# Patient Record
Sex: MALE | Race: WHITE
[De-identification: names, ages, dates, MRNs, and addresses within clinical notes are randomized per-mention and may not be internally consistent; named-entity substitution may affect disease eponyms.]

---

## 2021-12-28 ENCOUNTER — HOSPITAL ENCOUNTER (OUTPATIENT)
Dept: HOSPITAL 35 - LAB | Age: 86
End: 2021-12-28
Payer: COMMERCIAL

## 2021-12-28 DIAGNOSIS — Z01.812: Primary | ICD-10-CM

## 2021-12-28 DIAGNOSIS — Z20.822: ICD-10-CM

## 2021-12-30 ENCOUNTER — HOSPITAL ENCOUNTER (OUTPATIENT)
Dept: HOSPITAL 35 - OR | Age: 86
Discharge: HOME | End: 2021-12-30
Attending: OPHTHALMOLOGY
Payer: COMMERCIAL

## 2021-12-30 VITALS — WEIGHT: 148 LBS | HEIGHT: 70 IN | BODY MASS INDEX: 21.19 KG/M2

## 2021-12-30 VITALS — SYSTOLIC BLOOD PRESSURE: 129 MMHG | DIASTOLIC BLOOD PRESSURE: 78 MMHG

## 2021-12-30 DIAGNOSIS — K21.9: ICD-10-CM

## 2021-12-30 DIAGNOSIS — Z79.899: ICD-10-CM

## 2021-12-30 DIAGNOSIS — Z90.49: ICD-10-CM

## 2021-12-30 DIAGNOSIS — H02.834: Primary | ICD-10-CM

## 2021-12-30 DIAGNOSIS — Z85.828: ICD-10-CM

## 2021-12-30 DIAGNOSIS — Z98.890: ICD-10-CM

## 2021-12-30 DIAGNOSIS — Z87.442: ICD-10-CM

## 2021-12-30 DIAGNOSIS — I25.2: ICD-10-CM

## 2021-12-30 DIAGNOSIS — H53.452: ICD-10-CM

## 2021-12-30 DIAGNOSIS — I25.10: ICD-10-CM

## 2021-12-30 DIAGNOSIS — Z98.42: ICD-10-CM

## 2021-12-30 DIAGNOSIS — E78.5: ICD-10-CM

## 2021-12-30 DIAGNOSIS — I48.91: ICD-10-CM

## 2021-12-30 DIAGNOSIS — Z98.41: ICD-10-CM

## 2021-12-30 DIAGNOSIS — I10: ICD-10-CM

## 2021-12-30 DIAGNOSIS — Z79.01: ICD-10-CM

## 2021-12-30 LAB
ANION GAP SERPL CALC-SCNC: 8 MMOL/L (ref 7–16)
BUN SERPL-MCNC: 35 MG/DL (ref 7–18)
CALCIUM SERPL-MCNC: 9.4 MG/DL (ref 8.5–10.1)
CHLORIDE SERPL-SCNC: 110 MMOL/L (ref 98–107)
CO2 SERPL-SCNC: 27 MMOL/L (ref 21–32)
CREAT SERPL-MCNC: 1.3 MG/DL (ref 0.7–1.3)
GLUCOSE SERPL-MCNC: 92 MG/DL (ref 74–106)
POTASSIUM SERPL-SCNC: 4.4 MMOL/L (ref 3.5–5.1)
SODIUM SERPL-SCNC: 145 MMOL/L (ref 136–145)

## 2021-12-30 PROCEDURE — 70005: CPT

## 2021-12-30 PROCEDURE — 62850: CPT

## 2021-12-30 PROCEDURE — 56531: CPT

## 2021-12-30 PROCEDURE — 62110: CPT

## 2021-12-30 PROCEDURE — 50386 REMOVE STENT VIA TRANSURETH: CPT

## 2021-12-30 PROCEDURE — 50010 RENAL EXPLORATION: CPT

## 2021-12-30 PROCEDURE — 51636: CPT

## 2021-12-30 PROCEDURE — 50101: CPT

## 2021-12-30 PROCEDURE — 50398: CPT

## 2022-01-03 NOTE — O
[facility]
[address]
[place]   [address]                     OPERATIVE REPORT              
_______________________________________________________________________________
 
Name:       CALLIE WAYNE        Room #:                     [facility].#:      [id number]                       Account #:      [id number]  
Admission:  12/30/21    Attend Phys:    Allan Bass MD  
Discharge:              Date of Birth:  [birth year]  
                                                          Report #: [id number]
                                                                    [id number] 
_______________________________________________________________________________
THIS REPORT FOR:  
 
cc:  FAM - Family physician unknown
     FAM - Family physician unknown                                       
     Allan Bass MD                                          ~
 
 
cc:     _____ _____, Allan Harry MD
DATE OF SERVICE: 12/30/2021
 
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 
loss.
 
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 
loss.
 
PROCEDURE:  Left upper lid functional blepharoplasty.
 
SURGEON:  Allan Bass MD
 
ASSISTANT:  None.
 
ANESTHESIA:  MAC.
 
COMPLICATIONS:  None.
 
INDICATIONS FOR SURGERY:  This pleasant 86-year-old gentleman has 
dermatochalasia which while minimally asymmetric really only bothers him on the 
left side.  Automated parametric testing shows dense superior visual field loss 
that improves more than 30% and in excess of 12 degrees with the upper lid 
lifting.  He presents today for a unilateral left upper lid blepharoplasty.  
Informed consent was obtained to include but not limited to the potential risk 
for loss of vision, bleeding, infection, failure to improve the problem, and the
potential need for further surgery or treatment including need for the same 
procedure on the other side.
 
DESCRIPTION OF PROCEDURE:  The patient was taken to the operating room where 2% 
Xylocaine with epinephrine mixed with equal parts 0.75% Marcaine with Wydase was
administered transcutaneously to the left upper lid.  The patient was 
subsequently prepped and draped in the usual sterile fashion.  A fine tip skin 
marking pen was then utilized to outline the left upper lid crease.  A Graefe 
forceps was then used to quantitate the redundant upper lid skin and it was 
similarly outlined.  The incisions were then made with a Jennyfer scissor and a 
skin muscle flap removed with a high temp cautery.  The soft tissue in the upper
lid was then sculpted with the monopolar cautery.  The upper lid crease was then
reformed with interrupted 6-0 chromic sutures.  The skin was then closed with a 
 
 
 
[facility]
[address]
[place]   [address]                     OPERATIVE REPORT              
_______________________________________________________________________________
 
Name:       CALLIE WAYNE        Room #:                     [facility].#:      [id number]                       Account #:      [id number]  
Admission:  12/30/21    Attend Phys:    Allan Bass MD  
Discharge:              Date of Birth:  [birth year]  
                                                          Report #: [id number]
                                                                    [id number] 
_______________________________________________________________________________
 
6-0 plain gut suture.  The wound was then cleaned and dressed with bacitracin 
ophthalmic ointment.  The patient was subsequently transported to the recovery 
area having tolerated the procedure well with no anesthetic or operative 
complications being noted.
 
 
 
 
 
 
 
 
 
 
 
 
 
 
 
 
 
 
 
 
 
 
 
 
 
 
 
 
 
 
 
 
 
 
 
 
 
 
 
  <ELECTRONICALLY SIGNED>
   By: Allan Bass MD          
  01/03/22     0612
D: 12/30/21 0644                           _____________________________________
T: 12/30/21 0657                           Allan Bass MD            /nt